# Patient Record
Sex: MALE | URBAN - METROPOLITAN AREA
[De-identification: names, ages, dates, MRNs, and addresses within clinical notes are randomized per-mention and may not be internally consistent; named-entity substitution may affect disease eponyms.]

---

## 2022-04-06 ENCOUNTER — OFFICE VISIT (OUTPATIENT)
Dept: FAMILY MEDICINE CLINIC | Facility: CLINIC | Age: 52
End: 2022-04-06

## 2022-04-06 VITALS
OXYGEN SATURATION: 99 % | TEMPERATURE: 97.7 F | HEIGHT: 69 IN | SYSTOLIC BLOOD PRESSURE: 126 MMHG | RESPIRATION RATE: 14 BRPM | DIASTOLIC BLOOD PRESSURE: 80 MMHG | HEART RATE: 78 BPM | WEIGHT: 200.4 LBS | BODY MASS INDEX: 29.68 KG/M2

## 2022-04-06 DIAGNOSIS — G89.29 CHRONIC PAIN OF RIGHT ANKLE: Primary | ICD-10-CM

## 2022-04-06 DIAGNOSIS — R07.9 CHEST PAIN, UNSPECIFIED TYPE: ICD-10-CM

## 2022-04-06 DIAGNOSIS — M25.571 CHRONIC PAIN OF RIGHT ANKLE: Primary | ICD-10-CM

## 2022-04-06 PROCEDURE — 93000 ELECTROCARDIOGRAM COMPLETE: CPT | Performed by: FAMILY MEDICINE

## 2022-04-06 PROCEDURE — 99204 OFFICE O/P NEW MOD 45 MIN: CPT | Performed by: FAMILY MEDICINE

## 2022-04-06 RX ORDER — AMOXICILLIN 500 MG/1
500 CAPSULE ORAL 3 TIMES DAILY
COMMUNITY

## 2022-04-06 RX ORDER — DICLOFENAC SODIUM 30 MG/G
GEL TOPICAL 4 TIMES DAILY
Qty: 100 G | Refills: 2 | Status: SHIPPED | OUTPATIENT
Start: 2022-04-06

## 2022-04-06 RX ORDER — MELOXICAM 15 MG/1
15 TABLET ORAL DAILY
COMMUNITY

## 2022-04-06 RX ORDER — NAPROXEN 500 MG/1
500 TABLET ORAL EVERY 12 HOURS PRN
COMMUNITY

## 2022-04-06 NOTE — PROGRESS NOTES
New Patient Office Visit      Patient Name: Debora Hanley  : 1970   MRN: [id number]     Chief Complaint:    Chief Complaint   Patient presents with   • Ankle Pain     Pt c/o pain and inflammation in ankle for 2 years. Pain comes and goes and radiates between a 2,7-8 on pain scale.   • Establish Care       History of Present Illness: Debora Hanley is a 52 y.o. male who is here today to establish care.  Patient has a chronic history of chest pain that is intermittent.  He also has a chronic history of this right ankle pain.  Patient is here to establish care today. Patient is [place] and we are using a  for the appointment. Patient did see a dr in [place] but is unsure of his name. Patient reports no significant health history.     Right ankle pain--patient reports right ankle pain x 2 years. Patient stated he woke up with pain in ankle one day throbbing and it hasn't gone away. The pain comes and goes and will flare up if he sits with his legs chelsey crossed or puts his right foot on his left leg. Patient has had an MRI at [facility] in the last years which patient states showed inflammation. Patient states he has taken pain medication for pain like meloxicam and naproxen. Patient has seen orthopedic at [facility] as well but has not had any improvement. He was given medication and a boot to help with the pain but nothing has helped. Patient denies any injury to ankle in the past.     Patient states he has had a colonoscopy in the last year at [facility] and it was normal.  Patient defers shingles and TDaP vaccines today. Patient has had 2 doses of the Pfizer COVID vaccines.     Physical exam: Tenderness palpation of the medial malleolus on right ankle.  No reproduction of pain with palpation around the medial malleolus.  Patient's heart lung exam was normal without rales rhonchi's or murmurs.       Subjective          Past Medical History: History reviewed. No pertinent past medical  "history.    Past Surgical History: History reviewed. No pertinent surgical history.    Family History: History reviewed. No pertinent family history.    Social History:   Social History     Socioeconomic History   • Marital status: Unknown   Tobacco Use   • Smoking status: Never Smoker   • Smokeless tobacco: Never Used   Vaping Use   • Vaping Use: Never used   Substance and Sexual Activity   • Alcohol use: Not Currently       Medications:     Current Outpatient Medications:   •  amoxicillin (AMOXIL) 500 MG capsule, Take 500 mg by mouth 3 (Three) Times a Day., Disp: , Rfl:   •  meloxicam (MOBIC) 15 MG tablet, Take 15 mg by mouth Daily., Disp: , Rfl:   •  naproxen (NAPROSYN) 500 MG tablet, Take 500 mg by mouth Every 12 (Twelve) Hours As Needed for Mild Pain ., Disp: , Rfl:   •  Diclofenac Sodium (VOLTAREN) 3 % gel gel, Apply  topically to the appropriate area as directed 4 (Four) Times a Day. for 60 days., Disp: 100 g, Rfl: 2    Allergies:   No Known Allergies    Objective     Physical Exam: Please see above  Vital Signs:   Vitals:    04/06/22 0833   BP: 126/80   Pulse: 78   Resp: 14   Temp: 97.7 °F (36.5 °C)   TempSrc: Infrared   SpO2: 99%   Weight: 90.9 kg (200 lb 6.4 oz)   Height: 175.3 cm (69\")   PainSc:   8   PainLoc: Ankle     Body mass index is 29.59 kg/m².       Assessment / Plan      Assessment/Plan:   Diagnoses and all orders for this visit:    1. Chronic pain of right ankle (Primary)  -     Diclofenac Sodium (VOLTAREN) 3 % gel gel; Apply  topically to the appropriate area as directed 4 (Four) Times a Day. for 60 days.  Dispense: 100 g; Refill: 2  -     Ambulatory Referral to Orthopedic Surgery    2. Chest pain, unspecified type  -     ECG 12 Lead         1. Use transitional services today for patient's interpretation.  This will be billed as a level 4 due to using  services.    As far as patient's chest pain goes, it does not seem cardiac in nature.  patient reports having normal stress test in " the past.  We will get previous records to review.  Will consider getting stress test in the future.  May also send patient to cardiology.  Performed EKG today which showed moderate intraventricular conduction delay.  We will get patient's previous records to see if this is a change from previous EKGs.    For patient's right ankle pain we will refer to orthopedics for second opinion.  I would not get x-ray or MRI today.  I did review patient's MRI which showed inflammation on the lateral aspect of his right ankle not the medial aspect.  He is having pain on the medial aspect directly on the malleolus.  It seems arthritic in nature to myself.  Recommend diclofenac gel until he follows up with orthopedics.      We will see patient back in 3 months for annual wellness.  Will check up on above conditions and consider stress test or cardiology referral at that time.          ECG 12 Lead    Date/Time: 4/6/2022 11:03 AM  Performed by: Maycol Downs DO  Authorized by: Maycol Downs DO   Comparison: not compared with previous ECG   Previous ECG: no previous ECG available  Rhythm: sinus rhythm  Rate: normal  Conduction: non-specific intraventricular conduction delay  ST Segments: ST segments normal  T Waves: T waves normal  QRS axis: normal  Other: no other findings    Clinical impression: abnormal EKG            I attest that I have reviewed the student note and that the components of the history of the present illness, the physical exam, and the assessment and plan documented were performed by me or were performed in my presence by the student and verified by me.      Follow Up:   Return in about 3 months (around 7/6/2022) for Annual.    Maycol Downs DO  [facility] [place]